# Patient Record
Sex: FEMALE | Race: BLACK OR AFRICAN AMERICAN | NOT HISPANIC OR LATINO | URBAN - NONMETROPOLITAN AREA
[De-identification: names, ages, dates, MRNs, and addresses within clinical notes are randomized per-mention and may not be internally consistent; named-entity substitution may affect disease eponyms.]

---

## 2022-11-03 ENCOUNTER — OFFICE (OUTPATIENT)
Dept: URBAN - NONMETROPOLITAN AREA CLINIC 9 | Facility: CLINIC | Age: 62
End: 2022-11-03

## 2022-11-03 VITALS
DIASTOLIC BLOOD PRESSURE: 70 MMHG | WEIGHT: 202 LBS | RESPIRATION RATE: 16 BRPM | HEIGHT: 65 IN | HEART RATE: 59 BPM | SYSTOLIC BLOOD PRESSURE: 140 MMHG

## 2022-11-03 DIAGNOSIS — R14.0 ABDOMINAL DISTENSION (GASEOUS): ICD-10-CM

## 2022-11-03 DIAGNOSIS — K59.09 OTHER CONSTIPATION: ICD-10-CM

## 2022-11-03 DIAGNOSIS — R11.0 NAUSEA: ICD-10-CM

## 2022-11-03 DIAGNOSIS — R13.10 DYSPHAGIA, UNSPECIFIED: ICD-10-CM

## 2022-11-03 DIAGNOSIS — Z86.010 PERSONAL HISTORY OF COLONIC POLYPS: ICD-10-CM

## 2022-11-03 PROCEDURE — 99203 OFFICE O/P NEW LOW 30 MIN: CPT | Mod: 95

## 2022-11-03 NOTE — SERVICEHPINOTES
Zabrina Whittington   is a   63 yo  female   whom I am seeing as a new patient today.. She comes in today for further evaluation of nausea  , dysphagia, constipation and bloating.  She has tried Linzess 145 micro g which does give her watery stools. she has not tried MiraLax for extended period of time.   she is currently having a bowel movement every 2-4 days.  She has associated periumbilical abdominal cramping and bloating. She had EGD several years ago injected with dilation of esophageal stricture.  Her last colonoscopy was done in 2018 by Dr. Melchor in [place]. she was told that she had polyps and needed a repeat in 2023.
br
br    she reports symptoms of nausea that began  about 6 weeks ago.  It has improved somewhat with Carafate and a PPI.    She reports dysphagia tlo meats and bread with similar symptoms prior to her EGD with dilation several years ago.  She recently had ultrasound and was told she may have pancreatitis.  We have requested that report as well as her labs.  She does not have a history of pancreatitis.

## 2022-11-03 NOTE — SERVICENOTES
This patient is being seen today via telehealth and is aware of the limitations of telemedicine and gives verbal consent to proceed with the visit. This visit was completed via audio/visual ZOOM due to the restrictions of the COVID-19 pandemic.  All issues as stated above were discussed and addressed with the patient.  This telemedicine visit took place with the patient in the office and my assistant, Comfort Alan LPN present.

Start time:09:40
End time:09:57